# Patient Record
Sex: FEMALE | Race: WHITE | NOT HISPANIC OR LATINO
[De-identification: names, ages, dates, MRNs, and addresses within clinical notes are randomized per-mention and may not be internally consistent; named-entity substitution may affect disease eponyms.]

---

## 2018-03-06 PROBLEM — Z00.00 ENCOUNTER FOR PREVENTIVE HEALTH EXAMINATION: Status: ACTIVE | Noted: 2018-03-06

## 2018-03-19 ENCOUNTER — APPOINTMENT (OUTPATIENT)
Dept: OPHTHALMOLOGY | Facility: CLINIC | Age: 83
End: 2018-03-19
Payer: MEDICARE

## 2018-03-19 DIAGNOSIS — H25.13 AGE-RELATED NUCLEAR CATARACT, BILATERAL: ICD-10-CM

## 2018-03-19 PROCEDURE — 92014 COMPRE OPH EXAM EST PT 1/>: CPT

## 2018-05-21 ENCOUNTER — EMERGENCY (EMERGENCY)
Facility: HOSPITAL | Age: 83
LOS: 1 days | Discharge: HOME | End: 2018-05-21
Admitting: EMERGENCY MEDICINE
Payer: MEDICARE

## 2018-05-21 VITALS
SYSTOLIC BLOOD PRESSURE: 151 MMHG | RESPIRATION RATE: 16 BRPM | HEART RATE: 85 BPM | DIASTOLIC BLOOD PRESSURE: 73 MMHG | OXYGEN SATURATION: 96 % | TEMPERATURE: 98 F
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
## 2018-05-21 DIAGNOSIS — S40.262A INSECT BITE (NONVENOMOUS) OF LEFT SHOULDER, INITIAL ENCOUNTER: ICD-10-CM
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
## 2018-05-21 PROCEDURE — 99283 EMERGENCY DEPT VISIT LOW MDM: CPT

## 2018-05-21 NOTE — ED ADULT NURSE NOTE - OBJECTIVE STATEMENT
Pt is a 89y female AAOx3 complaining of a tick bite near her left collar bone. Pt states that tick would have been in placed for less than 24hrs. Tick was removed at bedside. Pt denies fever, chills, nausea, vomiting, sob, chest pain, dizziness. Will continue to monitor.

## 2018-05-21 NOTE — ED PROVIDER NOTE - SKIN, MLM
Skin normal color for race, warm, dry and intact. No evidence of rash. tick to left clavicular area-removed intact in ed, no rash or surrounding erythema, no target lesion

## 2018-05-21 NOTE — ED PROVIDER NOTE - MEDICAL DECISION MAKING DETAILS
tick bite, was in [place] over the weekend.  started on doxycycline and will have titers drawn with pmd in 2 weeks. tick bite, was in [place] over the weekend.  started on doxycycline and will have titers drawn with pmd in 2 weeks.  discussed side effects of medication including sun sensitivity

## 2018-05-21 NOTE — ED ADULT NURSE NOTE - ADDITIONAL PRINTED INSTRUCTIONS GIVEN
left before paperwork was given. Verbal discharge given by VANESSA Bocanegra. Picking up antibiotics.

## 2018-05-21 NOTE — ED PROVIDER NOTE - OBJECTIVE STATEMENT
[facility]/ Janene Goldman Pt is 89 yo female who presents with tick in place to left clavicle after she notes she was in [place] over the weekend and could have been there up to 3 days.  Pt denies any rash, pain, fevers, chills, headache or other symptoms.  Pt requesting antibiotic prophylaxis.

## 2018-09-17 ENCOUNTER — APPOINTMENT (OUTPATIENT)
Dept: OPHTHALMOLOGY | Facility: CLINIC | Age: 83
End: 2018-09-17

## 2020-10-22 ENCOUNTER — EMERGENCY (EMERGENCY)
Facility: HOSPITAL | Age: 85
LOS: 1 days | Discharge: HOME | End: 2020-10-22
Attending: EMERGENCY MEDICINE | Admitting: EMERGENCY MEDICINE
Payer: MEDICARE

## 2020-10-22 VITALS
RESPIRATION RATE: 16 BRPM | OXYGEN SATURATION: 97 % | HEIGHT: 61 IN | TEMPERATURE: 98 F | DIASTOLIC BLOOD PRESSURE: 63 MMHG | SYSTOLIC BLOOD PRESSURE: 126 MMHG | HEART RATE: 88 BPM | WEIGHT: 104.94 LBS

## 2020-10-22 VITALS
OXYGEN SATURATION: 97 % | TEMPERATURE: 98 F | DIASTOLIC BLOOD PRESSURE: 71 MMHG | RESPIRATION RATE: 16 BRPM | SYSTOLIC BLOOD PRESSURE: 138 MMHG | HEART RATE: 78 BPM

## 2020-10-22 DIAGNOSIS — Z98.890 OTHER SPECIFIED POSTPROCEDURAL STATES: Chronic | ICD-10-CM

## 2020-10-22 LAB
ALBUMIN SERPL ELPH-MCNC: 3.7 G/DL — SIGNIFICANT CHANGE UP (ref 3.4–5)
ALP SERPL-CCNC: 81 U/L — SIGNIFICANT CHANGE UP (ref 40–120)
ALT FLD-CCNC: 21 U/L — SIGNIFICANT CHANGE UP (ref 12–42)
ANION GAP SERPL CALC-SCNC: 7 MMOL/L — LOW (ref 9–16)
APPEARANCE UR: CLEAR — SIGNIFICANT CHANGE UP
AST SERPL-CCNC: 23 U/L — SIGNIFICANT CHANGE UP (ref 15–37)
BACTERIA # UR AUTO: PRESENT /HPF
BASOPHILS # BLD AUTO: 0.04 K/UL — SIGNIFICANT CHANGE UP (ref 0–0.2)
BASOPHILS NFR BLD AUTO: 0.6 % — SIGNIFICANT CHANGE UP (ref 0–2)
BILIRUB SERPL-MCNC: 0.5 MG/DL — SIGNIFICANT CHANGE UP (ref 0.2–1.2)
BILIRUB UR-MCNC: NEGATIVE — SIGNIFICANT CHANGE UP
BUN SERPL-MCNC: 46 MG/DL — HIGH (ref 7–23)
CALCIUM SERPL-MCNC: 11 MG/DL — HIGH (ref 8.5–10.5)
CHLORIDE SERPL-SCNC: 105 MMOL/L — SIGNIFICANT CHANGE UP (ref 96–108)
CO2 SERPL-SCNC: 28 MMOL/L — SIGNIFICANT CHANGE UP (ref 22–31)
COLOR SPEC: YELLOW — SIGNIFICANT CHANGE UP
CREAT SERPL-MCNC: 1.33 MG/DL — HIGH (ref 0.5–1.3)
D DIMER BLD IA.RAPID-MCNC: 3131 NG/ML DDU — HIGH
DIFF PNL FLD: ABNORMAL
EOSINOPHIL # BLD AUTO: 0.1 K/UL — SIGNIFICANT CHANGE UP (ref 0–0.5)
EOSINOPHIL NFR BLD AUTO: 1.5 % — SIGNIFICANT CHANGE UP (ref 0–6)
EPI CELLS # UR: SIGNIFICANT CHANGE UP /HPF (ref 0–5)
GLUCOSE SERPL-MCNC: 105 MG/DL — HIGH (ref 70–99)
GLUCOSE UR QL: NEGATIVE — SIGNIFICANT CHANGE UP
HCT VFR BLD CALC: 36.3 % — SIGNIFICANT CHANGE UP (ref 34.5–45)
HGB BLD-MCNC: 11.8 G/DL — SIGNIFICANT CHANGE UP (ref 11.5–15.5)
IMM GRANULOCYTES NFR BLD AUTO: 0.3 % — SIGNIFICANT CHANGE UP (ref 0–1.5)
KETONES UR-MCNC: NEGATIVE — SIGNIFICANT CHANGE UP
LACTATE SERPL-SCNC: 0.9 MMOL/L — SIGNIFICANT CHANGE UP (ref 0.4–2)
LEUKOCYTE ESTERASE UR-ACNC: NEGATIVE — SIGNIFICANT CHANGE UP
LYMPHOCYTES # BLD AUTO: 0.96 K/UL — LOW (ref 1–3.3)
LYMPHOCYTES # BLD AUTO: 14.5 % — SIGNIFICANT CHANGE UP (ref 13–44)
MAGNESIUM SERPL-MCNC: 1.9 MG/DL — SIGNIFICANT CHANGE UP (ref 1.6–2.6)
MCHC RBC-ENTMCNC: 30.1 PG — SIGNIFICANT CHANGE UP (ref 27–34)
MCHC RBC-ENTMCNC: 32.5 GM/DL — SIGNIFICANT CHANGE UP (ref 32–36)
MCV RBC AUTO: 92.6 FL — SIGNIFICANT CHANGE UP (ref 80–100)
MONOCYTES # BLD AUTO: 0.52 K/UL — SIGNIFICANT CHANGE UP (ref 0–0.9)
MONOCYTES NFR BLD AUTO: 7.9 % — SIGNIFICANT CHANGE UP (ref 2–14)
NEUTROPHILS # BLD AUTO: 4.96 K/UL — SIGNIFICANT CHANGE UP (ref 1.8–7.4)
NEUTROPHILS NFR BLD AUTO: 75.2 % — SIGNIFICANT CHANGE UP (ref 43–77)
NITRITE UR-MCNC: NEGATIVE — SIGNIFICANT CHANGE UP
NRBC # BLD: 0 /100 WBCS — SIGNIFICANT CHANGE UP (ref 0–0)
NT-PROBNP SERPL-SCNC: 826 PG/ML — HIGH
PH UR: 5.5 — SIGNIFICANT CHANGE UP (ref 5–8)
PLATELET # BLD AUTO: 181 K/UL — SIGNIFICANT CHANGE UP (ref 150–400)
POTASSIUM SERPL-MCNC: 4.4 MMOL/L — SIGNIFICANT CHANGE UP (ref 3.5–5.3)
POTASSIUM SERPL-SCNC: 4.4 MMOL/L — SIGNIFICANT CHANGE UP (ref 3.5–5.3)
PROT SERPL-MCNC: 7.4 G/DL — SIGNIFICANT CHANGE UP (ref 6.4–8.2)
PROT UR-MCNC: NEGATIVE MG/DL — SIGNIFICANT CHANGE UP
RBC # BLD: 3.92 M/UL — SIGNIFICANT CHANGE UP (ref 3.8–5.2)
RBC # FLD: 13.2 % — SIGNIFICANT CHANGE UP (ref 10.3–14.5)
RBC CASTS # UR COMP ASSIST: < 5 /HPF — SIGNIFICANT CHANGE UP
SODIUM SERPL-SCNC: 140 MMOL/L — SIGNIFICANT CHANGE UP (ref 132–145)
SP GR SPEC: 1.01 — SIGNIFICANT CHANGE UP (ref 1–1.03)
TROPONIN I SERPL-MCNC: <0.017 NG/ML — LOW (ref 0.02–0.06)
UROBILINOGEN FLD QL: 0.2 E.U./DL — SIGNIFICANT CHANGE UP
WBC # BLD: 6.6 K/UL — SIGNIFICANT CHANGE UP (ref 3.8–10.5)
WBC # FLD AUTO: 6.6 K/UL — SIGNIFICANT CHANGE UP (ref 3.8–10.5)
WBC UR QL: < 5 /HPF — SIGNIFICANT CHANGE UP

## 2020-10-22 PROCEDURE — 72125 CT NECK SPINE W/O DYE: CPT | Mod: 26

## 2020-10-22 PROCEDURE — G0168: CPT

## 2020-10-22 PROCEDURE — 99285 EMERGENCY DEPT VISIT HI MDM: CPT | Mod: 25

## 2020-10-22 PROCEDURE — 70450 CT HEAD/BRAIN W/O DYE: CPT | Mod: 26

## 2020-10-22 PROCEDURE — 71045 X-RAY EXAM CHEST 1 VIEW: CPT | Mod: 26

## 2020-10-22 PROCEDURE — 93010 ELECTROCARDIOGRAM REPORT: CPT | Mod: 59

## 2020-10-22 PROCEDURE — 71275 CT ANGIOGRAPHY CHEST: CPT | Mod: 26

## 2020-10-22 RX ORDER — TETANUS TOXOID, REDUCED DIPHTHERIA TOXOID AND ACELLULAR PERTUSSIS VACCINE, ADSORBED 5; 2.5; 8; 8; 2.5 [IU]/.5ML; [IU]/.5ML; UG/.5ML; UG/.5ML; UG/.5ML
0.5 SUSPENSION INTRAMUSCULAR ONCE
Refills: 0 | Status: COMPLETED | OUTPATIENT
Start: 2020-10-22 | End: 2020-10-22

## 2020-10-22 RX ORDER — SODIUM CHLORIDE 9 MG/ML
1000 INJECTION INTRAMUSCULAR; INTRAVENOUS; SUBCUTANEOUS ONCE
Refills: 0 | Status: COMPLETED | OUTPATIENT
Start: 2020-10-22 | End: 2020-10-22

## 2020-10-22 RX ADMIN — SODIUM CHLORIDE 1000 MILLILITER(S): 9 INJECTION INTRAMUSCULAR; INTRAVENOUS; SUBCUTANEOUS at 15:54

## 2020-10-22 RX ADMIN — SODIUM CHLORIDE 1000 MILLILITER(S): 9 INJECTION INTRAMUSCULAR; INTRAVENOUS; SUBCUTANEOUS at 18:05

## 2020-10-22 RX ADMIN — SODIUM CHLORIDE 1000 MILLILITER(S): 9 INJECTION INTRAMUSCULAR; INTRAVENOUS; SUBCUTANEOUS at 14:49

## 2020-10-22 RX ADMIN — TETANUS TOXOID, REDUCED DIPHTHERIA TOXOID AND ACELLULAR PERTUSSIS VACCINE, ADSORBED 0.5 MILLILITER(S): 5; 2.5; 8; 8; 2.5 SUSPENSION INTRAMUSCULAR at 15:53

## 2020-10-22 NOTE — ED PROCEDURE NOTE - CPROC ED TIME OUT STATEMENT1
1.  Debrox ear drops (4 drops to each ear twice daily for 4 days on and 4 days off) when symptoms recur.  2.  Recommend f/u in clinic for ear washing as needed.  
“Patient's name, , procedure and correct site were confirmed during the [facility] Timeout.”

## 2020-10-22 NOTE — ED ADULT NURSE NOTE - CHIEF COMPLAINT QUOTE
Walk in s/p  fall in street, +daily asa. ? LOC; as per patient she doesn't remember the fall itself, but does remember getting up and having to go home and change. Arrives a&ox3 with minor laceration to left eyebrow with surrounding ecchymosis and abrasion to right knee.

## 2020-10-22 NOTE — ED PROVIDER NOTE - SKIN, MLM
Skin normal color for race, warm, dry and intact. R knee abrasion, L eyebrow region laceration ~ .5cm Skin normal color for race, warm, dry and intact. R knee abrasion, L eyebrow region laceration ~ 1 cm

## 2020-10-22 NOTE — ED PROVIDER NOTE - CARE PROVIDER_API CALL
Oumar Lozano  CARDIOVASCULAR DISEASE  [address] [facility], [address]rd [place] [address]  Phone: [phone]  Fax: [phone]  Follow Up Time:

## 2020-10-22 NOTE — ED PROVIDER NOTE - PATIENT PORTAL LINK FT
You can access the FollowMyHealth Patient Portal offered by [facility] by registering at the following website: [email or web address]. By joining [employer]’s FollowMyHealth portal, you will also be able to view your health information using other applications (apps) compatible with our system.

## 2020-10-22 NOTE — ED PROVIDER NOTE - OBJECTIVE STATEMENT
patient is a 89 y/o F with PMH of Breast CA with radiation, HTN and HLD. patient states she was in the street on her way to meet a friend when she fell and has no recollection of the events leading up to the fall or immediately after. Patient reports she went home to change after sustaining fall and she came to ER upon the recommendation of her friend with whom she was scheduled to meet. Patient also reports SOB with increased activity and minor right side weakness. patient denies headache, nausea/vomiting, recent infection, dizziness or visual disturbances patient is a 89 y/o F with PMH of Breast CA with radiation, HTN and HLD. patient states she was in the street on her way to meet a friend when she fell and has no recollection of the events leading up to the fall or immediately after. Patient reports she went home to change after sustaining fall and she came to ER upon the recommendation of her friend with whom she was scheduled to meet. Patient also reports SOB with increased activity over the last couple of months. Her PMD suggested that she see cards but there was a 2 month wait. She also had some minor right side weakness over a similar period of time. patient denies headache, nausea/vomiting, recent infection, dizziness or visual disturbances. She now feels at baseline. No UTI sx. ? last Td/

## 2020-10-22 NOTE — ED PROVIDER NOTE - CLINICAL SUMMARY MEDICAL DECISION MAKING FREE TEXT BOX
Patient presenting with apparent syncope while walking. BUN/Cr slightly elevated, getting IVF. UA pending, CXR and EKG wnl. DDimer ~ 3100. Will check CTA. Dermabond lac and update Td.

## 2020-10-22 NOTE — ED PROVIDER NOTE - ENMT, MLM
Airway patent, Nasal mucosa clear. Mouth with normal mucosa. Throat has no vesicles, no oropharyngeal exudates and uvula is midline. Airway patent, Nasal mucosa clear. MM somewhat dry. ~ 1cm jagged L outer eyebrow laceration. No Bob facial tenderness. Slight contusion to the left lower lip. Bite intact. One tooth feels a little chipped.

## 2020-10-22 NOTE — ED ADULT NURSE NOTE - OBJECTIVE STATEMENT
patient c/o trip and fall in street with LOC; laceration above left eyebrow with hematoma noted; patient ambulating into ER with steady gait; no other c/o; alert and oriented x3; denies use of blood thinners

## 2020-10-22 NOTE — ED PROVIDER NOTE - NSFOLLOWUPINSTRUCTIONS_ED_ALL_ED_FT
I suspect that dehydration was at the root of your fainting.     You showed very trace fluid on the lungs. This suggests that you need an echocardiogram of your heart. Please see our cardiologist next week. Please also follow up with your PMD and bring copies of your test results.     If you develop headaches or dizziness or nausea or vomiting please return to the ER for a repeat CT head.    Post-Concussion Syndrome    Post-concussion syndrome is when symptoms last longer than normal after a head injury.  What are the signs or symptoms?  After a head injury, you may:  Have headaches. Feel tired. Feel dizzy. Feel weak. Have trouble seeing. Have trouble in bright lights .Have trouble hearing. Not be able to remember things. Not be able to focus. Have trouble sleeping. Have mood swings. Have trouble learning new things. These can last from weeks to months.    Follow these instructions at home:  Medicines: Zofran 4mg PO as needed for nausea and vomiting.     Take all medicines only as told by your doctor.  Do not take prescription pain medicines.    Activity     Limit activities as told by your doctor. This includes:  Homework. Job-related work. Thinking. Watching TV. Using a computer or phone. Puzzles Exercise. Sports.  Slowly return to your normal activity as told by your doctor. Stop an activity if you have symptoms. Do not do anything that may cause you to get injured again.    General instructions     Rest. Try to:  Sleep 7–9 hours each night. Take naps or breaks when you feel tired during the day. Do not drink alcohol until your doctor says that you can. Keep track of your symptoms. Keep all follow-up visits as told by your doctor. This is important.     Contact a doctor if:  You do not improve. You get worse. You have another injury.   Get help right away if:  You have a very bad headache. You feel confused. You feel very sleepy. You pass out (faint). You throw up (vomit).You feel weak in any part of your body. You feel numb in any part of your body. You start shaking (have a seizure).You have trouble talking.     Summary  Post-concussion syndrome is when symptoms last longer than normal after a head injury. Limit all activity after your injury. Gradually return to normal activity as told by your doctor. Rest, do not drink alcohol, and avoid prescription pain medicines after a concussion. Call your doctor if your symptoms get worse.    This information is not intended to replace advice given to you by your health care provider. Make sure you discuss any questions you have with your health care provider.     Laceration    Keep dry for 24 hours  The glue will come off on its own- can pick off gently in a week.     A laceration is a cut that goes through all of the layers of the skin and into the tissue that is right under the skin. Some lacerations heal on their own. Others need to be closed with skin adhesive strips, skin glue, stitches (sutures), or staples. Proper laceration care minimizes the risk of infection and helps the laceration to heal better.  If non-absorbable stitches or staples have been placed, they must be taken out within the time frame instructed by your healthcare provider.    SEEK IMMEDIATE MEDICAL CARE IF YOU HAVE ANY OF THE FOLLOWING SYMPTOMS: swelling around the wound, worsening pain, drainage from the wound, red streaking going away from your wound, inability to move finger or toe near the laceration, or discoloration of skin near the laceration.    Syncope    Syncope is when you temporarily lose consciousness, also called fainting or passing out. It is caused by a sudden decrease in blood flow to the brain. Even though most causes of syncope are not dangerous, syncope can possibly be a sign of a serious medical problem. Signs that you may be about to faint include feeling dizzy, lightheaded, nausea, visual changes, or cold/clammy skin. Do not drive, operate heavy machinery, or play sports until your health care provider says it is okay.    SEEK IMMEDIATE MEDICAL CARE IF YOU HAVE ANY OF THE FOLLOWING SYMPTOMS: severe headache, pain in your chest/abdomen/back, bleeding from your mouth or rectum, palpitations, shortness of breath, pain with breathing, seizure, confusion, or trouble walking.

## 2020-10-22 NOTE — ED ADULT TRIAGE NOTE - CHIEF COMPLAINT QUOTE
Walk in s/p  fall in street, +daily asa. As per patient she doesn't remember the fall itself, but does remember getting up and having to go home and change. Arrives a&ox3 with minor laceration to left eyebrow with surrounding ecchymosis and abrasion to right knee. Walk in s/p  fall in street, +daily asa. ? LOC; as per patient she doesn't remember the fall itself, but does remember getting up and having to go home and change. Arrives a&ox3 with minor laceration to left eyebrow with surrounding ecchymosis and abrasion to right knee.

## 2020-10-22 NOTE — ED PROVIDER NOTE - CARE PLAN
Principal Discharge DX:	Syncope, unspecified syncope type  Secondary Diagnosis:	Head injuries, initial encounter  Secondary Diagnosis:	Facial laceration, initial encounter

## 2020-10-22 NOTE — ED PROVIDER NOTE - PROGRESS NOTE DETAILS
Feeling good, labs showed slightly elevated BNP, neg trop and elevated DDimer (3100). CTA neg for PE, + mild PVC. Will dc with cards fu. patient is insisting to leave. She was dehydrated by labs., Got 2 L NS and ate. Lac closed with dermabond. I suspect dehydration was at the root of the syncope (vs arrythmia).

## 2020-10-22 NOTE — ED PROVIDER NOTE - CONSTITUTIONAL, MLM
normal... Well appearing, awake, alert, oriented to person, place, time/situation and in no apparent distress. Treat and Release
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
## 2020-10-26 DIAGNOSIS — R55 SYNCOPE AND COLLAPSE: ICD-10-CM
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
## 2020-10-26 DIAGNOSIS — S01.112A LACERATION WITHOUT FOREIGN BODY OF LEFT EYELID AND PERIOCULAR AREA, INITIAL ENCOUNTER: ICD-10-CM

## 2020-10-26 DIAGNOSIS — S09.90XA UNSPECIFIED INJURY OF HEAD, INITIAL ENCOUNTER: ICD-10-CM

## 2020-11-19 ENCOUNTER — EMERGENCY (EMERGENCY)
Facility: HOSPITAL | Age: 85
LOS: 1 days | Discharge: HOME | End: 2020-11-19
Admitting: EMERGENCY MEDICINE
Payer: MEDICARE

## 2020-11-19 VITALS
HEIGHT: 61 IN | HEART RATE: 63 BPM | OXYGEN SATURATION: 98 % | DIASTOLIC BLOOD PRESSURE: 58 MMHG | RESPIRATION RATE: 15 BRPM | SYSTOLIC BLOOD PRESSURE: 127 MMHG | TEMPERATURE: 99 F

## 2020-11-19 DIAGNOSIS — Z98.890 OTHER SPECIFIED POSTPROCEDURAL STATES: Chronic | ICD-10-CM

## 2020-11-19 DIAGNOSIS — Z20.828 CONTACT WITH AND (SUSPECTED) EXPOSURE TO OTHER VIRAL COMMUNICABLE DISEASES: ICD-10-CM

## 2020-11-19 PROBLEM — C50.919 MALIGNANT NEOPLASM OF UNSPECIFIED SITE OF UNSPECIFIED FEMALE BREAST: Chronic | Status: ACTIVE | Noted: 2020-10-22

## 2020-11-19 PROBLEM — E78.00 PURE HYPERCHOLESTEROLEMIA, UNSPECIFIED: Chronic | Status: ACTIVE | Noted: 2020-10-22

## 2020-11-19 PROBLEM — I10 ESSENTIAL (PRIMARY) HYPERTENSION: Chronic | Status: ACTIVE | Noted: 2020-10-22

## 2020-11-19 PROCEDURE — 99283 EMERGENCY DEPT VISIT LOW MDM: CPT | Mod: CS

## 2020-11-19 NOTE — ED PROVIDER NOTE - PATIENT PORTAL LINK FT
You can access the FollowMyHealth Patient Portal offered by [facility] by registering at the following website: [email or web address]. By joining [employer]’s FollowMyHealth portal, you will also be able to view your health information using other applications (apps) compatible with our system.

## 2020-11-19 NOTE — ED PROVIDER NOTE - PHYSICAL EXAMINATION
CONSTITUTIONAL: Well-appearing; well-nourished; in no apparent distress.   	HEAD: Normocephalic; atraumatic.   	EYES:  clear bilaterally  ENT: airway patent  Resp breathing comfortably with no distress  PSYCHOLOGICAL: The patient’s mood and manner are appropriate.

## 2020-11-19 NOTE — ED PROVIDER NOTE - DISPOSITION TYPE
Anesthesia:  Max Sue / Frank Gonzalez  / Niranjan Bronson     Attending; Rena Allen  ED   Direct   inhouse    LKW;  1/10/2020 @ 2220  TPA; bolus   infusion    Wheel in lab time:  0308   Groin puncture:  0344  TICI Baseline: 0    IV Cardene 10mg added to 4000 unit/ 1000 ml NS procedure infusion bag.      Micro cath at clot 0401    1st run  Deployment time: 0407   Tiger 17 device   1st run Retrieval time: 0414    No clot obtained  1st pass TICI score: 1    2nd pass deployment:  0424  Tiger 21 device  2nd pass retrieval:  0429  No clot obtained  2nd pass TICI score:  2b    Micro cath at M3 branch 0436    3rd pass deployment time: 0442  trevo xp device  3rd pass retrieval time: 0447  Small red clot obtained   3rd pass TICI score: 2c     Closure time: 0503    Arrival - groin access time: 111 min  (1 hr 51 min)  Team needed called in   Arrival - open time: 153 min (2hrs 33 min 2B)  LKW-open time: 369 mins  ( 6 hrs 9 min ) DISCHARGE

## 2020-11-20 LAB — SARS-COV-2 RNA SPEC QL NAA+PROBE: SIGNIFICANT CHANGE UP

## 2020-12-21 ENCOUNTER — EMERGENCY (EMERGENCY)
Facility: HOSPITAL | Age: 85
LOS: 1 days | Discharge: HOME | End: 2020-12-21
Admitting: EMERGENCY MEDICINE
Payer: MEDICARE

## 2020-12-21 VITALS
WEIGHT: 110.01 LBS | DIASTOLIC BLOOD PRESSURE: 80 MMHG | OXYGEN SATURATION: 95 % | HEIGHT: 61 IN | HEART RATE: 90 BPM | RESPIRATION RATE: 16 BRPM | TEMPERATURE: 98 F | SYSTOLIC BLOOD PRESSURE: 151 MMHG

## 2020-12-21 DIAGNOSIS — Z20.828 CONTACT WITH AND (SUSPECTED) EXPOSURE TO OTHER VIRAL COMMUNICABLE DISEASES: ICD-10-CM

## 2020-12-21 DIAGNOSIS — Z98.890 OTHER SPECIFIED POSTPROCEDURAL STATES: Chronic | ICD-10-CM

## 2020-12-21 PROCEDURE — 99283 EMERGENCY DEPT VISIT LOW MDM: CPT | Mod: CS

## 2020-12-21 NOTE — ED PROVIDER NOTE - NSFOLLOWUPINSTRUCTIONS_ED_ALL_ED_FT
THE COVID 19 TEST RESULTS  - results may take up to 2-3 days to become available   - if you have consented, you will receive your results electronically   -  you can check [employer] AMANDA or call [phone] to discuss your results with our nursing staff    Please continue to self quarantine (home isolation) until your result is back and follow instructions accordingly  - positive: complete home isolation for a total of 14 days since day of testing and no more fever with feeling back to baseline   - negative: you will be able to stop home isolation but still practice standard precautions, similar to how you would manage a regular flu/cold.    Return to ER for any worsening symptoms, such as persistent fever >100.4F, shortness of breath, coughing up bloody sputum, chest pain, lethargy, and fainting    Please remember to wash your hands frequently (>20 seconds each time), avoid touching your face, and cover your cough/sneeze.  Always wear a mask when you are outside of your home and practice social distancing.    Only take Tylenol for fever/pain control and avoid NSAIDs (ibuprofen/Advil/Aleve/naproxen) due to potential increased risk of exacerbating COVID-19 infection

## 2020-12-21 NOTE — ED PROVIDER NOTE - PATIENT PORTAL LINK FT
You can access the FollowMyHealth Patient Portal offered by [facility] by registering at the following website: [email or web address]. By joining [employer]’s FollowMyHealth portal, you will also be able to view your health information using other applications (apps) compatible with our system.

## 2020-12-21 NOTE — ED PROVIDER NOTE - OBJECTIVE STATEMENT
89 y/o female presents to the ED requesting COVID-19 testing. Patient is currently asymptomatic and has no other medical complaints at this time. Denies fever, chills, chest pain, SOB, cough.

## 2020-12-22 LAB — SARS-COV-2 RNA SPEC QL NAA+PROBE: SIGNIFICANT CHANGE UP

## 2021-01-11 ENCOUNTER — EMERGENCY (EMERGENCY)
Facility: HOSPITAL | Age: 86
LOS: 1 days | Discharge: HOME | End: 2021-01-11
Admitting: EMERGENCY MEDICINE
Payer: MEDICARE

## 2021-01-11 VITALS
RESPIRATION RATE: 16 BRPM | WEIGHT: 110.01 LBS | OXYGEN SATURATION: 97 % | TEMPERATURE: 98 F | DIASTOLIC BLOOD PRESSURE: 105 MMHG | HEART RATE: 99 BPM | HEIGHT: 61 IN | SYSTOLIC BLOOD PRESSURE: 218 MMHG

## 2021-01-11 VITALS
OXYGEN SATURATION: 97 % | HEART RATE: 79 BPM | DIASTOLIC BLOOD PRESSURE: 68 MMHG | SYSTOLIC BLOOD PRESSURE: 180 MMHG | RESPIRATION RATE: 16 BRPM | TEMPERATURE: 98 F

## 2021-01-11 DIAGNOSIS — M25.511 PAIN IN RIGHT SHOULDER: ICD-10-CM

## 2021-01-11 DIAGNOSIS — I10 ESSENTIAL (PRIMARY) HYPERTENSION: ICD-10-CM

## 2021-01-11 DIAGNOSIS — Z79.2 LONG TERM (CURRENT) USE OF ANTIBIOTICS: ICD-10-CM

## 2021-01-11 DIAGNOSIS — Z98.890 OTHER SPECIFIED POSTPROCEDURAL STATES: Chronic | ICD-10-CM

## 2021-01-11 DIAGNOSIS — E78.00 PURE HYPERCHOLESTEROLEMIA, UNSPECIFIED: ICD-10-CM

## 2021-01-11 PROCEDURE — 73030 X-RAY EXAM OF SHOULDER: CPT | Mod: 26,RT

## 2021-01-11 PROCEDURE — 99284 EMERGENCY DEPT VISIT MOD MDM: CPT | Mod: 25

## 2021-01-11 RX ORDER — MORPHINE SULFATE 50 MG/1
2 CAPSULE, EXTENDED RELEASE ORAL ONCE
Refills: 0 | Status: DISCONTINUED | OUTPATIENT
Start: 2021-01-11 | End: 2021-01-11

## 2021-01-11 RX ORDER — KETOROLAC TROMETHAMINE 30 MG/ML
15 SYRINGE (ML) INJECTION ONCE
Refills: 0 | Status: DISCONTINUED | OUTPATIENT
Start: 2021-01-11 | End: 2021-01-11

## 2021-01-11 RX ADMIN — MORPHINE SULFATE 2 MILLIGRAM(S): 50 CAPSULE, EXTENDED RELEASE ORAL at 18:15

## 2021-01-11 RX ADMIN — Medication 15 MILLIGRAM(S): at 18:57

## 2021-01-11 NOTE — ED PROVIDER NOTE - CLINICAL SUMMARY MEDICAL DECISION MAKING FREE TEXT BOX
89 y/o F presents to ED R shoulder pain.  Pt appears uncomfortable.  Pt has elevated BP.  Xray shoulder shows degenerative changes, no dislocation or fracture.  Pt placed in sling for comfort and advised to f/u with orthopedist tomorrow as scheduled.  Return precautions advised.

## 2021-01-11 NOTE — ED PROVIDER NOTE - CONSTITUTIONAL, MLM
normal... Appears uncomfortable, awake, alert, oriented to person, place, time/situation and in no apparent distress.

## 2021-01-11 NOTE — ED ADULT NURSE NOTE - CHIEF COMPLAINT QUOTE
right shoulder dislocation 10mins pta. "I was just getting the mail and I must have pulled or turned it the wrong way." limited rom, pulses present +deformity hx rotator cuff issues. ambulatory with steady gait.

## 2021-01-11 NOTE — ED PROVIDER NOTE - NSFOLLOWUPINSTRUCTIONS_ED_ALL_ED_FT
FOLLOW UP WITH ORTHOPEDICS.  CALL FOR A FOLLOW UP APPOINTMENT.    RETURN FOR WEAKNESS, NUMBNESS, OR OTHER CONCERNS.

## 2021-01-11 NOTE — ED ADULT NURSE NOTE - OBJECTIVE STATEMENT
Pt presents c/o 10/10 pain to right shoulder 2ndary to non-specific injury to right shoulder.  Pt endorses known rotator cuff tear, and cannot elicit specific event today.  Pt has +deformity with bruising and swelling and LROM.  Pt has +pulses to RUE.  Pt upgraded for pain.  Pt pending imaging.

## 2021-01-11 NOTE — ED PROVIDER NOTE - OBJECTIVE STATEMENT
89 y/o F presents to ED c/o acute on chronic R shoulder pain.  Pt states she has had chronic pain in R shoulder that has progressively worsened over the past 2 weeks.  She has been evaluated outpt and had a MRI.  She was diagnosed with a rotator cuff injury.  Today she notes increased pain at 8 am this morning.  There is not inciting event.  She took ibuprofen with moderate relief but her pain has returned.  She notes decreased range of  motion to the shoulder for several weeks. Pt has a follow up appt with her orthopedist tomorrow.  Pt denies numbness/tingling, fall/trauma, weakness.

## 2021-01-11 NOTE — ED PROVIDER NOTE - MUSCULOSKELETAL MINIMAL EXAM
R shoulder:  R shoulder edema, + 2 radial pulse, + axillary, medial, ulnar, radial sensation, 5/5 hand grasp.  No clavicular deformity or ttp

## 2021-01-11 NOTE — ED PROVIDER NOTE - PATIENT PORTAL LINK FT
You can access the FollowMyHealth Patient Portal offered by [facility] by registering at the following website: [email or web address]. By joining [employer]’s FollowMyHealth portal, you will also be able to view your health information using other applications (apps) compatible with our system.

## 2021-01-11 NOTE — ED ADULT TRIAGE NOTE - CHIEF COMPLAINT QUOTE
right shoulder dislocation 10mins pta. "I was just getting the mail and I must have pulled or turned it the wrong way." limited rom, pulses present +deformity hx rotator cuff issues. right shoulder dislocation 10mins pta. "I was just getting the mail and I must have pulled or turned it the wrong way." limited rom, pulses present +deformity hx rotator cuff issues. ambulatory with steady gait.

## 2021-01-19 ENCOUNTER — EMERGENCY (EMERGENCY)
Facility: HOSPITAL | Age: 86
LOS: 1 days | Discharge: HOME | End: 2021-01-19
Admitting: EMERGENCY MEDICINE
Payer: MEDICARE

## 2021-01-19 VITALS
WEIGHT: 110.01 LBS | DIASTOLIC BLOOD PRESSURE: 80 MMHG | HEART RATE: 84 BPM | OXYGEN SATURATION: 98 % | SYSTOLIC BLOOD PRESSURE: 137 MMHG | HEIGHT: 61 IN | RESPIRATION RATE: 18 BRPM | TEMPERATURE: 98 F

## 2021-01-19 VITALS
OXYGEN SATURATION: 98 % | RESPIRATION RATE: 18 BRPM | HEART RATE: 80 BPM | SYSTOLIC BLOOD PRESSURE: 129 MMHG | DIASTOLIC BLOOD PRESSURE: 81 MMHG

## 2021-01-19 DIAGNOSIS — M25.511 PAIN IN RIGHT SHOULDER: ICD-10-CM

## 2021-01-19 DIAGNOSIS — Z98.890 OTHER SPECIFIED POSTPROCEDURAL STATES: Chronic | ICD-10-CM

## 2021-01-19 PROCEDURE — 99283 EMERGENCY DEPT VISIT LOW MDM: CPT

## 2021-01-19 RX ORDER — LIDOCAINE 4 G/100G
1 CREAM TOPICAL ONCE
Refills: 0 | Status: COMPLETED | OUTPATIENT
Start: 2021-01-19 | End: 2021-01-19

## 2021-01-19 RX ORDER — ACETAMINOPHEN WITH CODEINE 300MG-30MG
1 TABLET ORAL ONCE
Refills: 0 | Status: DISCONTINUED | OUTPATIENT
Start: 2021-01-19 | End: 2021-01-19

## 2021-01-19 RX ORDER — ACETAMINOPHEN WITH CODEINE 300MG-30MG
1 TABLET ORAL
Qty: 12 | Refills: 0
Start: 2021-01-19

## 2021-01-19 RX ADMIN — LIDOCAINE 1 PATCH: 4 CREAM TOPICAL at 16:35

## 2021-01-19 RX ADMIN — Medication 1 TABLET(S): at 16:34

## 2021-01-19 NOTE — ED PROVIDER NOTE - PMH
Breast cancer  2010 bilateral with radiation  Hypercholesterolemia    Hypertension    Rotator cuff injury

## 2021-01-19 NOTE — ED PROVIDER NOTE - OBJECTIVE STATEMENT
89 year old Female was recently diagnosed with a rotator cuff tear before [place], she as been seeing Dr. Hicks and does have an appointment at [facility] since she will be treated by conservative measures instead of surgery. Today she as sitting comfortably but developed right arm pain, and came in today for pain relief. Today she took Meloxicam 7.5mg with some relief.     Denies numbness, tingling. 89 year old Female was recently diagnosed with a rotator cuff tear right shoulder, states she has been seeing Dr. Hicks and does have an appointment at [facility] tomorrow, has been treated with conservative measures instead of surgery. Today she as sitting comfortably but developed right arm pain, and came in today for pain relief. Today she took Meloxicam 7.5mg with some relief. Denies trauma or fall.     Denies numbness, tingling, chest pain or dyspnea.

## 2021-01-19 NOTE — ED PROVIDER NOTE - PATIENT PORTAL LINK FT
You can access the FollowMyHealth Patient Portal offered by [facility] by registering at the following website: [email or web address]. By joining [employer]’s FollowMyHealth portal, you will also be able to view your health information using other applications (apps) compatible with our system.

## 2021-01-19 NOTE — ED PROVIDER NOTE - NSFOLLOWUPINSTRUCTIONS_ED_ALL_ED_FT
Take pain medications as prescribed  Rest. Cool compresses    Follow up with Orthopedics tomorrow    RETURN TO THE EMERGENCY DEPARTMENT FOR ANY CONCERNING OR WORSENING SYMPTOMS.

## 2021-01-19 NOTE — ED PROVIDER NOTE - PHYSICAL EXAMINATION
CONSTITUTIONAL: Well-appearing; well-nourished; in no apparent distress.   	HEAD: Normocephalic; atraumatic.   	EYES:  clear  	ENT: airway patent  	NECK: Supple; non-tender;   	CARDIOVASCULAR: Normal S1, S2; no murmurs, rubs, or gallops. Regular rate and rhythm.   	RESPIRATORY: Breathing easily; breath sounds clear and equal bilaterally; no wheezes, rhonchi, or rales.  	EXT: RUE: normal appearance, +mild tenderness to anterior shoulder and shoulder blade, no sensory deficits. limited ROM due to pain. distal pulses intact. soft compartments.  	SKIN: Normal for age and race; warm; dry; good turgor; no apparent lesions or rash.   	NEURO: A & O x 3; face symmetric; grossly unremarkable.   PSYCHOLOGICAL: The patient’s mood and manner are appropriate.

## 2021-01-19 NOTE — ED PROVIDER NOTE - CLINICAL SUMMARY MEDICAL DECISION MAKING FREE TEXT BOX
Discussed pain management options and referred to orthopedics doctor who she has an appointment with tomorrow. Will prescribe a few days of analgesics but urged patient to follow up with ortho. chronic right shoulder pain/rotator cuff injury, discussed pain management options and referred to orthopedics doctor who she has an appointment with tomorrow. Will prescribe a few days of analgesics, urged patient to follow up with ortho.

## 2021-01-29 ENCOUNTER — EMERGENCY (EMERGENCY)
Facility: HOSPITAL | Age: 86
LOS: 1 days | Discharge: HOME | End: 2021-01-29
Attending: EMERGENCY MEDICINE | Admitting: EMERGENCY MEDICINE
Payer: MEDICARE

## 2021-01-29 VITALS
DIASTOLIC BLOOD PRESSURE: 81 MMHG | SYSTOLIC BLOOD PRESSURE: 167 MMHG | RESPIRATION RATE: 18 BRPM | OXYGEN SATURATION: 99 % | HEART RATE: 92 BPM

## 2021-01-29 VITALS
RESPIRATION RATE: 16 BRPM | HEIGHT: 61 IN | HEART RATE: 97 BPM | OXYGEN SATURATION: 98 % | DIASTOLIC BLOOD PRESSURE: 80 MMHG | TEMPERATURE: 98 F | SYSTOLIC BLOOD PRESSURE: 179 MMHG

## 2021-01-29 DIAGNOSIS — M75.101 UNSPECIFIED ROTATOR CUFF TEAR OR RUPTURE OF RIGHT SHOULDER, NOT SPECIFIED AS TRAUMATIC: ICD-10-CM

## 2021-01-29 DIAGNOSIS — M25.511 PAIN IN RIGHT SHOULDER: ICD-10-CM

## 2021-01-29 DIAGNOSIS — Z98.890 OTHER SPECIFIED POSTPROCEDURAL STATES: Chronic | ICD-10-CM

## 2021-01-29 PROCEDURE — 99283 EMERGENCY DEPT VISIT LOW MDM: CPT

## 2021-01-29 RX ORDER — TRAMADOL HYDROCHLORIDE 50 MG/1
25 TABLET ORAL ONCE
Refills: 0 | Status: DISCONTINUED | OUTPATIENT
Start: 2021-01-29 | End: 2021-01-29

## 2021-01-29 RX ORDER — MELOXICAM 15 MG/1
1 TABLET ORAL
Qty: 30 | Refills: 0
Start: 2021-01-29

## 2021-01-29 RX ORDER — TRAMADOL HYDROCHLORIDE 50 MG/1
1 TABLET ORAL
Qty: 30 | Refills: 0
Start: 2021-01-29

## 2021-01-29 RX ADMIN — TRAMADOL HYDROCHLORIDE 25 MILLIGRAM(S): 50 TABLET ORAL at 21:15

## 2021-01-29 NOTE — ED PROVIDER NOTE - MUSCULOSKELETAL, MLM
Hx limited due to pt's vascular dementia. Dementia RIght shoulder with mild swelling & old appearing ecchymotic skin changes.  No erythema.  Trace warmth.  Limited ROM due to pain.  DNV intact.

## 2021-01-29 NOTE — ED PROVIDER NOTE - CLINICAL SUMMARY MEDICAL DECISION MAKING FREE TEXT BOX
Patient with acute on chronic shoulder pain.  Clinically most consistent with bursitis.  No joint effusion.  No signs of septic arthritis.  Encouraged to follow up with Ortho for MRI & further evaluation/treatment.

## 2021-01-29 NOTE — ED ADULT NURSE NOTE - CHIEF COMPLAINT QUOTE
right shoulder pain for 3 weeks denies injury, had fluid drained from shoulder 1 week ago, today, was at computer and felt had RSI as shoulder become more painful , was given pain meds by specialist who drained shoulder unsure of name, pt gcs 15, pmhx hypertension only

## 2021-01-29 NOTE — ED PROVIDER NOTE - NSFOLLOWUPINSTRUCTIONS_ED_ALL_ED_FT
A rotator cuff tear is a partial or complete tear of the cord-like bands (tendons) that connect muscle to bone in the rotator cuff. The rotator cuff is a group of muscles and tendons that surround the shoulder joint and keep the upper arm bone (humerus) in the shoulder socket.    The tear can occur suddenly (acute tear) or can develop over a long period of time (chronic tear).     What are the causes?  Acute tears may be caused by:    A fall, especially on an outstretched arm.  Lifting very heavy objects with a jerking motion.    Chronic tears may be caused by overuse of the muscles. This may happen in sports, physical work, or activities in which your arm repeatedly moves over your head.    What increases the risk?  This condition is more likely to occur in:    Athletes and workers who frequently use their shoulder or reach over their heads. This may include activities such as:  Tennis.  Baseball and softball.  Swimming and rowing.  Weightlifting.  Construction work.  Painting.  People who smoke.  Older people who have arthritis or poor blood supply. These can make the muscles and tendons weaker.    What are the signs or symptoms?  Symptoms of this condition depend on the type and severity of the injury:    An acute tear may include a sudden tearing feeling, followed by severe pain that goes from your upper shoulder, down your arm, and toward your elbow.  A chronic tear includes a gradual weakness and decreased shoulder motion as the pain gets worse. The pain is usually worse at night.    Both types may have symptoms such as:    Pain that spreads (radiates) from the shoulder to the upper arm.  Swelling and tenderness in front of the shoulder.  Decreased range of motion.  Pain when:  Reaching, pulling, or lifting the arm above the head.  Lowering the arm from above the head.  Not being able to raise your arm out to the side.  Difficulty placing the arm behind your back.    How is this diagnosed?  This condition is diagnosed with a medical history and physical exam. Imaging tests may also be done, including:    X-rays.  MRI.  Ultrasound.  CT or MR arthrogram. During this test, a contrast material is injected into your shoulder and then images are taken.    How is this treated?  Treatment for this condition depends on the type and severity of the condition. In less severe cases, treatment may include:    Rest. This may be done with a sling that holds the shoulder still (immobilization). Your health care provider may also recommend avoiding activities that involve lifting your arm over your head.  Icing the shoulder.  Anti-inflammatory medicines, such as aspirin or ibuprofen.  Strengthening and stretching exercises. Your health care provider may recommend specific exercises to improve your range of motion and strengthen your shoulder.    In more severe cases, treatment may include:    Physical therapy.  Steroid injections.  Surgery.    Follow these instructions at home:  Managing pain, stiffness, and swelling    If directed, put ice on the injured area.  If you have a removable sling, remove it as told by your health care provider.  Put ice in a plastic bag.  Place a towel between your skin and the bag.  Leave the ice on for 20 minutes, 2–3 times a day.  Raise (elevate) the injured area above the level of your heart while you are lying down.  Find a comfortable sleeping position or sleep on a recliner, if available.  Move your fingers often to avoid stiffness and to lessen swelling.   Once the swelling has gone down, your health care provider may direct you to apply heat to relax the muscles. Use the heat source that your health care provider recommends, such as a moist heat pack or a heating pad.  Place a towel between your skin and the heat source.  Leave the heat on for 20–30 minutes.  Remove the heat if your skin turns bright red. This is especially important if you are unable to feel pain, heat, or cold. You may have a greater risk of getting burned.    If you have a sling:    Wear the sling as told by your health care provider. Remove it only as told by your health care provider.   Loosen the sling if your fingers tingle, become numb, or turn cold and blue.  Keep the sling clean.  If the sling is not waterproof:  Do not let it get wet.  Cover it with a watertight covering when you take a bath or a shower.    Driving    Do not drive or use heavy machinery while taking prescription pain medicine.  Ask your health care provider when it is safe to drive if you have a sling on your arm.    Activity    Rest your shoulder as told by your health care provider.  Return to your normal activities as told by your health care provider. Ask your health care provider what activities are safe for you.  Do any exercises or stretches as told by your health care provider.    General instructions    Do not use any products that contain nicotine or tobacco, such as cigarettes and e-cigarettes. If you need help quitting, ask your health care provider.  Take over-the-counter and prescription medicines only as told by your health care provider.  Keep all follow-up visits as told by your health care provider. This is important.    Contact a health care provider if:  Your pain gets worse.  You have new pain in your arm, hands, or fingers.  Medicine does not help your pain.    Get help right away if:  Your arm, hand, or fingers are numb or tingling.  Your arm, hand, or fingers are swollen or painful or they turn white or blue.  Your hand or fingers on your injured arm are colder than your other hand.    Summary  A rotator cuff tear is a partial or complete tear of the cord-like bands (tendons) that connect muscle to bone in the rotator cuff.  The tear can occur suddenly (acute tear) or can develop over a long period of time (chronic tear).  Treatment generally includes rest, anti-inflammatory medicines, and icing. In some cases, physical therapy and steroid injections may be needed. In severe cases, surgery may be needed.

## 2021-01-29 NOTE — ED PROVIDER NOTE - PATIENT PORTAL LINK FT
You can access the FollowMyHealth Patient Portal offered by [facility] by registering at the following website: [email or web address]. By joining [facility]’s FollowMyHealth portal, you will also be able to view your health information using other applications (apps) compatible with our system.

## 2021-01-29 NOTE — ED PROVIDER NOTE - OBJECTIVE STATEMENT
The patient states her right shoulder has been hurting for approximately 3 weeks.  She does not recall any specific injury or trauma.  She saw an orthopedic surgeon 1 week ago and had an arthrocentesis with transient improvement.  She now has increased pain & swelling.  She denies any fevers/chills.  There has been some bruising since the procedure but she denies any redness/warmth.  She denies neck or chest pain.  She denies any neurologic symptoms.

## 2021-01-29 NOTE — ED ADULT NURSE REASSESSMENT NOTE - CONDITION
ambulatory with steady gait + deformity to right shoulder accompanied with old bruising. daughter at bedside states needs assistance with pain management has surgery f/u

## 2021-01-30 PROBLEM — S46.009A UNSPECIFIED INJURY OF MUSCLE(S) AND TENDON(S) OF THE ROTATOR CUFF OF UNSPECIFIED SHOULDER, INITIAL ENCOUNTER: Chronic | Status: ACTIVE | Noted: 2021-01-19

## 2021-02-11 ENCOUNTER — EMERGENCY (EMERGENCY)
Facility: HOSPITAL | Age: 86
LOS: 1 days | Discharge: HOME | End: 2021-02-11
Attending: EMERGENCY MEDICINE | Admitting: EMERGENCY MEDICINE
Payer: MEDICARE

## 2021-02-11 VITALS
TEMPERATURE: 99 F | OXYGEN SATURATION: 95 % | HEIGHT: 61 IN | SYSTOLIC BLOOD PRESSURE: 165 MMHG | WEIGHT: 110.01 LBS | DIASTOLIC BLOOD PRESSURE: 82 MMHG | RESPIRATION RATE: 17 BRPM | HEART RATE: 83 BPM
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
## 2021-02-11 DIAGNOSIS — S49.91XA UNSPECIFIED INJURY OF RIGHT SHOULDER AND UPPER ARM, INITIAL ENCOUNTER: ICD-10-CM

## 2021-02-11 DIAGNOSIS — M25.511 PAIN IN RIGHT SHOULDER: ICD-10-CM
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
## 2021-02-11 DIAGNOSIS — Z98.890 OTHER SPECIFIED POSTPROCEDURAL STATES: Chronic | ICD-10-CM

## 2021-02-11 PROCEDURE — 99283 EMERGENCY DEPT VISIT LOW MDM: CPT

## 2021-02-11 RX ORDER — OXYCODONE AND ACETAMINOPHEN 5; 325 MG/1; MG/1
1 TABLET ORAL ONCE
Refills: 0 | Status: DISCONTINUED | OUTPATIENT
Start: 2021-02-11 | End: 2021-02-11

## 2021-02-11 RX ADMIN — OXYCODONE AND ACETAMINOPHEN 1 TABLET(S): 5; 325 TABLET ORAL at 13:15

## 2021-02-11 NOTE — ED PROVIDER NOTE - NSFOLLOWUPINSTRUCTIONS_ED_ALL_ED_FT
Muscle Strain    WHAT YOU NEED TO KNOW:    A muscle strain is a twist, pull, or tear of a muscle or tendon. A tendon is a strong elastic tissue that connects a muscle to a bone. Signs of a strained muscle include bruising and swelling over the area, pain with movement, and loss of strength.          DISCHARGE INSTRUCTIONS:    Return to the emergency department if:     You suddenly cannot feel or move your injured muscle.        Contact your healthcare provider if:     Your pain and swelling worsen or do not go away.       You have questions or concerns about your condition or care.    Medicines:     NSAIDs help decrease swelling and pain or fever. This medicine is available with or without a doctor's order. NSAIDs can cause stomach bleeding or kidney problems in certain people. If you take blood thinner medicine, always ask your healthcare provider if NSAIDs are safe for you. Always read the medicine label and follow directions.      Muscle relaxers help decrease pain and muscle spasms.      Take your medicine as directed. Contact your healthcare provider if you think your medicine is not helping or if you have side effects. Tell him of her if you are allergic to any medicine. Keep a list of the medicines, vitamins, and herbs you take. Include the amounts, and when and why you take them. Bring the list or the pill bottles to follow-up visits. Carry your medicine list with you in case of an emergency.    Follow up with your healthcare provider as directed: Your healthcare provider may suggest that you have a follow-up visit before you go back to your usual activity. Write down your questions so you remember to ask them during your visits.    Self-care:     3 to 7 days after the injury: Use Rest, Ice, Compression, and Elevation (RICE) to help stop bruising and decrease pain and swelling.  Rest: Rest your muscle to allow your injury to heal. When the pain decreases, begin normal, slow movements. For mild and moderate muscle strains, you should rest your muscles for about 2 days. However, if you have a severe muscle strain, you should rest for 10 to 14 days. You may need to use crutches to walk if your muscle strain is in your legs or lower body.       Ice: Put an ice pack on the injured area. Put a towel between the ice pack and your skin. Do not put the ice pack directly on your skin. You can use a package of frozen peas instead of an ice pack.      Compression: You may need to wrap an elastic bandage around the area to decrease swelling. It should be tight enough for you to feel support. Do not wrap it too tightly.       Elevation: Keep the injured muscle raised above your heart if possible. For example if you have a strain of your lower leg muscle, lie down and prop your leg up on pillows. This helps decrease pain and swelling.      3 to 21 days after the injury: Start to slowly and regularly exercise your muscle. This will help it heal. If you feel pain, decrease how hard you are exercising.       1 to 6 weeks after the injury: Stretch the injured muscle. Hold the stretch for about 30 seconds. Do this 4 times a day. You may stretch the muscle until you feel a slight pull. Stop stretching if you feel pain.       2 weeks to 6 months after the injury: The goal of this phase is to return to the activity you were doing before the injury happened, without hurting the muscle again.       3 weeks to 6 months after the injury: Keep stretching and strengthening your muscles to avoid injury. Slowly increase the time and distance that you exercise. You may have signs and symptoms of muscle strain 6 months after the injury, even if you do things to help it heal. In this case, you may need surgery on the muscle.    How to Use a Sling    WHAT YOU NEED TO KNOW:    A sling is a strong fabric loop that hangs from your neck to support your arm. Your arm, bent at the elbow, rests in the sling. Some slings have a strap that goes down your back to take the weight off your neck. This strap is connected to the elbow side of the sling. Your healthcare provider will help you decide which sling is best for you and where you can get one. A sling helps prevent your hand, arm, and shoulder from moving so your injury can heal. A sling can also help if you have a heavy cast on your arm.Shoulder Sling         DISCHARGE INSTRUCTIONS:    How to use a sling: Your healthcare provider will teach you how to put on your sling. Follow the directions below to help you put on the sling at home:     Gently bend your injured arm at the elbow and hold it in front of you, across your body. Your thumb should be pointing up.      Put the strap of the sling around your neck. The strap usually has an adhesive fabric to make it easy for you to fasten the strap.      Put your arm in the sling so your elbow is in the closed end of the sling. Your hand will be at the open end of the sling.      Put the edge of the sling so it covers the first fold of the little finger.       Wiggle your fingers as directed to prevent stiffness in your hand.

## 2021-02-11 NOTE — ED PROVIDER NOTE - PATIENT PORTAL LINK FT
You can access the FollowMyHealth Patient Portal offered by [facility] by registering at the following website: [email or web address]. By joining [employer]’s FollowMyHealth portal, you will also be able to view your health information using other applications (apps) compatible with our system.

## 2021-02-11 NOTE — ED ADULT NURSE NOTE - OBJECTIVE STATEMENT
89 y/o female with h/o right torn rotator cuff- c/o right shoulder pain- states she took her percocet but still continues to have this chronic right shoulder pain- pt is a+Ox3 denies any recent injury or trauma

## 2021-02-11 NOTE — ED PROVIDER NOTE - OBJECTIVE STATEMENT
89 y.o. female with c/o chronic R shopudler pain, has appt to see DR Martinez at [facility] tomorrow, c/o worsening pain over the last 2-3 days, ahs been taling her daughter percocet for pain, was recc to wear a sling but she didn't find it helpful 89 y.o. female with c/o chronic R shoulder pain, has appt to see DR Mcdaniel at [facility] tomorrow, c/o worsening pain over the last 2-3 days, has been taking her daughter percocet for pain, was recc to wear a sling but she didn't find it helpful. Had XRays 2 weeks ago at dr mcdaniel office

## 2021-02-11 NOTE — ED ADULT NURSE NOTE - CAS ELECT INFOMATION PROVIDED
Sling in place on right arm; tolerating well; daughter to set up transportation for patient to get home/DC instructions

## 2021-02-11 NOTE — ED PROVIDER NOTE - PHYSICAL EXAMINATION
VSS in NAD non toxic appearing   R shoudler DPI NVI dec ROM due to pain no deformity no swelling   normal neuro exam CN I-XII grossly intact, no groos motor or sensory deficits  no peripheral c/c/e

## 2022-06-16 NOTE — ED ADULT TRIAGE NOTE - BP NONINVASIVE SYSTOLIC (MM HG)
Advised compliance, educated to put reminders and get help from the family to ensure compliance with the insulin and blood glucose monitoring 151

## 2022-12-30 NOTE — ED PROVIDER NOTE - CARDIOVASCULAR NEGATIVE STATEMENT, MLM
1900 - Bedside and Verbal shift change report given to Gaetano Keller RN (oncoming nurse) by Seth Real RN (offgoing nurse). Report included the following information SBAR, Kardex, Intake/Output, MAR, Recent Results and Cardiac Rhythm NSR. Pt resting quietly in bed at this time. Call bell within reach. Will continue to monitor. 2030 - Pt cleaned after incontinence of urine and bowel. Placed on R side with heels floated. Groin dressing saturated with urine. Will redress. Call bell within reach. Will continue to monitor. 2118 - Pt reports generalized pain 3/10. Treated with tylenol. Zosyn infusing. Call bell within reach. 200 - Dressing in groin changed by [place], RN. Replaced honeycomb and tegaderm. Pt tolerated. Will continue to monitor. 0045 - Cleaned after incontinence of urine and bowel. Desitin applied. New bed pad placed. Groin dressing CDI. Pt placed on R side. Heels floated. Call bell within reach. 0451 - Cleaned pt after incontinence of urine. Desitin applied. New bed pad placed. Pt placed supine, Heels floated. VSS. Labs drawn and sent. Call bell within reach. 0700 - Shift report given to Seth Real RN. Orders Placed This Encounter    AMB SUPPLY ORDER     Diagnosis: Obstructive Sleep Apnea ICD-10 Code (G47.33)    Positive Airway Pressure Therapy: Duration of need: 99 months. APAP Device with Heated Humidifer [id number] / [id number]. Minimum Pressure: 06 cmH2O, Maximum Pressure: 09 cmH2O.     Nasal Pillows Combo Mask (Replace) 2 per month.  Nasal Pillows (Replace) 2 per month.  Full Face Mask 1 every 3 months.  Full Face Mask Cushion 1 per month.  Nasal Cushion (Replace) 2 per month.  Nasal Interface Mask 1 every 3 months.  Headgear 1 every 6 months.  Chinstrap 1 every 6 months.  Tubing 1 every 3 months.  Filter(s) Disposable 2 per month.  Filter(s) Non-Disposable 1 every 6 months. .   [facility] for Humidifier (Replace) 1 every 6 months. Perform Mask Fitting per patient preference and comfort - replace as above. Vandana Robb MD, FAASM; NPI: [id number]  Electronically signed. 12/29/22 no chest pain and no edema.

## 2023-08-22 NOTE — ED ADULT TRIAGE NOTE - CHIEF COMPLAINT QUOTE
right shoulder pain for 3 weeks denies injury, had fluid drained from shoulder 1 week ago, today, was at computer and felt had RSI as shoulder become more painful , was given pain meds by specialist who drained shoulder unsure of name, pt gcs 15, pmhx hypertension only none
